# Patient Record
Sex: MALE | Race: WHITE | NOT HISPANIC OR LATINO | Employment: UNEMPLOYED | URBAN - METROPOLITAN AREA
[De-identification: names, ages, dates, MRNs, and addresses within clinical notes are randomized per-mention and may not be internally consistent; named-entity substitution may affect disease eponyms.]

---
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
## 2023-05-08 ENCOUNTER — HEALTH MAINTENANCE LETTER (OUTPATIENT)
Age: 18
End: 2023-05-08

## 2023-12-22 ENCOUNTER — TRANSFERRED RECORDS (OUTPATIENT)
Dept: HEALTH INFORMATION MANAGEMENT | Facility: CLINIC | Age: 18
End: 2023-12-22
Payer: COMMERCIAL

## 2023-12-24 ENCOUNTER — MEDICAL CORRESPONDENCE (OUTPATIENT)
Dept: HEALTH INFORMATION MANAGEMENT | Facility: CLINIC | Age: 18
End: 2023-12-24
Payer: COMMERCIAL

## 2023-12-26 DIAGNOSIS — Q67.6 PECTUS EXCAVATUM: Primary | ICD-10-CM

## 2023-12-26 DIAGNOSIS — M43.9 CURVATURE OF SPINE: ICD-10-CM

## 2024-03-05 ENCOUNTER — OFFICE VISIT (OUTPATIENT)
Dept: CARDIOLOGY | Facility: CLINIC | Age: 19
End: 2024-03-05
Payer: COMMERCIAL

## 2024-03-05 VITALS
HEIGHT: 73 IN | WEIGHT: 156.9 LBS | OXYGEN SATURATION: 100 % | SYSTOLIC BLOOD PRESSURE: 101 MMHG | HEART RATE: 80 BPM | BODY MASS INDEX: 20.79 KG/M2 | DIASTOLIC BLOOD PRESSURE: 64 MMHG

## 2024-03-05 DIAGNOSIS — M43.9 CURVATURE OF SPINE: ICD-10-CM

## 2024-03-05 DIAGNOSIS — Q67.6 PECTUS EXCAVATUM: ICD-10-CM

## 2024-03-05 PROCEDURE — 93000 ELECTROCARDIOGRAM COMPLETE: CPT | Performed by: INTERNAL MEDICINE

## 2024-03-05 PROCEDURE — 99204 OFFICE O/P NEW MOD 45 MIN: CPT | Performed by: INTERNAL MEDICINE

## 2024-03-05 NOTE — PROGRESS NOTES
CARDIOLOGY CONSULT    REASON FOR CONSULT: Rule out Marfan's    PRIMARY CARE PHYSICIAN:  Ross Cassidy    HISTORY OF PRESENT ILLNESS: Mr. Graff is a very pleasant 18-year-old gentleman with significant past medical history who presents with his mother over concerns the patient could have Marfan syndrome.  He is of tall stature.  He does confirm a family of tall stature and individuals and there is no documented history of Marfan syndrome in the family.  He gets regular eye exams and has not been diagnosed with ectopia lentis.  He is currently a senior in college majoring in [employer].  He is active and denies any exertional chest pain, chest discomfort or shortness of breath.  He denies any heart palpitations or racing heart.  He denies any lightheadedness or dizziness.  Wrist and thumb signs are negative.        PAST MEDICAL HISTORY:  No past medical history on file.    MEDICATIONS:  Current Outpatient Medications   Medication    fish oil-omega-3 fatty acids 1000 MG capsule    Multiple Vitamins-Minerals (MULTIVITAMIN ADULTS PO)     No current facility-administered medications for this visit.       ALLERGIES:  Allergies   Allergen Reactions    Dairy Products [Milk Protein]     Gluten Meal      Intolerance, tummy complaints    Seasonal Allergies        SOCIAL HISTORY:  Non-smoker, no significant alcohol.  Is a senior in college in [place]    FAMILY HISTORY:  History of coronary artery disease in second-degree relatives      REVIEW OF SYSTEMS:  A complete ROS was obtained and the pertinent positives are outlined in the history of present illness above.  The remainder of systems is negative.      PHYSICAL EXAM:      BP: 101/64 Pulse: 80     SpO2: 100 %      Vital Signs with Ranges  Pulse:  [80] 80  BP: (101)/(64) 101/64  SpO2:  [100 %] 100 %  156 lbs 14.4 oz    Constitutional: awake, alert, no distress  Eyes: PERRL, sclera nonicteric  ENT: trachea midline  Respiratory: Clear to auscultation  bilaterally  Cardiovascular: Regular rate and rhythm without murmurs rubs or gallops  GI: nondistended, nontender, bowel sounds present  Lymph/Hematologic: no lymphadenopathy  Skin: dry, no rash  Musculoskeletal: good muscle tone, strength 5/5 in upper and lower extremities  Neurologic: no focal deficits  Neuropsychiatric: appropriate affact    DATA:  EKG: Dated March 5, 2024 reviewed personally.  Normal sinus rhythm without diagnostic ST segment changes    ASSESSMENT:  1.  Tall stature, no clear evidence of Marfan syndrome    RECOMMENDATIONS:  1.  Reviewed the diagnostic criteria for Marfan syndrome including family history, positive gene marker, aortic root dilatation and ectopia lentis.  Will plan for screening echocardiogram to exclude aortic root dilatation or other valvular disease that would support diagnosis of Marfan syndrome.  If this is unremarkable, no further workup is necessary.      Yazmin Garcia MD [facility]  March 6, 2024    I spent a total of 45 minutes providing patient care.  This time spent includes chart review, time spent with the patient during the clinic visit and time spent afterwards providing necessary documentation.

## 2024-03-05 NOTE — LETTER
3/5/2024    Ross Cassidy, APRN [facility] [address]  [place] [address]    RE: Eran Graff       Dear Colleague,     I had the pleasure of seeing Eran Graff in the [facility].  CARDIOLOGY CONSULT    REASON FOR CONSULT: Rule out Marfan's    PRIMARY CARE PHYSICIAN:  Ross Cassidy    HISTORY OF PRESENT ILLNESS: Mr. Graff is a very pleasant 18-year-old gentleman with significant past medical history who presents with his mother over concerns the patient could have Marfan syndrome.  He is of tall stature.  He does confirm a family of tall stature and individuals and there is no documented history of Marfan syndrome in the family.  He gets regular eye exams and has not been diagnosed with ectopia lentis.  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  He is active and denies any exertional chest pain, chest discomfort or shortness of breath.  He denies any heart palpitations or racing heart.  He denies any lightheadedness or dizziness.  Wrist and thumb signs are negative.        PAST MEDICAL HISTORY:  No past medical history on file.    MEDICATIONS:  Current Outpatient Medications   Medication    fish oil-omega-3 fatty acids 1000 MG capsule    Multiple Vitamins-Minerals (MULTIVITAMIN ADULTS PO)     No current facility-administered medications for this visit.       ALLERGIES:  Allergies   Allergen Reactions    Dairy Products [Milk Protein]     Gluten Meal      Intolerance, tummy complaints    Seasonal Allergies        SOCIAL HISTORY:  Non-smoker, no significant alcohol.  Is a senior in college in [place]    FAMILY HISTORY:  History of coronary artery disease in second-degree relatives      REVIEW OF SYSTEMS:  A complete ROS was obtained and the pertinent positives are outlined in the history of present illness above.  The remainder of systems is negative.      PHYSICAL EXAM:      BP: 101/64 Pulse: 80     SpO2: 100 %      Vital Signs  with Ranges  Pulse:  [80] 80  BP: (101)/(64) 101/64  SpO2:  [100 %] 100 %  156 lbs 14.4 oz    Constitutional: awake, alert, no distress  Eyes: PERRL, sclera nonicteric  ENT: trachea midline  Respiratory: Clear to auscultation bilaterally  Cardiovascular: Regular rate and rhythm without murmurs rubs or gallops  GI: nondistended, nontender, bowel sounds present  Lymph/Hematologic: no lymphadenopathy  Skin: dry, no rash  Musculoskeletal: good muscle tone, strength 5/5 in upper and lower extremities  Neurologic: no focal deficits  Neuropsychiatric: appropriate affact    DATA:  EKG: Dated March 5, 2024 reviewed personally.  Normal sinus rhythm without diagnostic ST segment changes    ASSESSMENT:  1.  Tall stature, no clear evidence of Marfan syndrome    RECOMMENDATIONS:  1.  Reviewed the diagnostic criteria for Marfan syndrome including family history, positive gene marker, aortic root dilatation and ectopia lentis.  Will plan for screening echocardiogram to exclude aortic root dilatation or other valvular disease that would support diagnosis of Marfan syndrome.  If this is unremarkable, no further workup is necessary.      Yazmin Garcia MD [facility]  March 6, 2024    I spent a total of 45 minutes providing patient care.  This time spent includes chart review, time spent with the patient during the clinic visit and time spent afterwards providing necessary documentation.         Thank you for allowing me to participate in the care of your patient.      Sincerely,     Yazmin Garcia MD     [facility]  cc:   Ross Cassidy, [place] [address]

## 2024-05-08 ENCOUNTER — HOSPITAL ENCOUNTER (OUTPATIENT)
Dept: CARDIOLOGY | Facility: CLINIC | Age: 19
Discharge: HOME | End: 2024-05-08
Attending: INTERNAL MEDICINE | Admitting: INTERNAL MEDICINE
Payer: COMMERCIAL

## 2024-05-08 DIAGNOSIS — Q67.6 PECTUS EXCAVATUM: ICD-10-CM

## 2024-05-08 LAB — LVEF ECHO: NORMAL

## 2024-05-08 PROCEDURE — 93306 TTE W/DOPPLER COMPLETE: CPT | Mod: 26 | Performed by: INTERNAL MEDICINE

## 2024-05-08 PROCEDURE — 93306 TTE W/DOPPLER COMPLETE: CPT

## 2024-07-14 ENCOUNTER — HEALTH MAINTENANCE LETTER (OUTPATIENT)
Age: 19
End: 2024-07-14

## 2025-07-19 ENCOUNTER — HEALTH MAINTENANCE LETTER (OUTPATIENT)
Age: 20
End: 2025-07-19